# Patient Record
Sex: FEMALE | Race: WHITE
[De-identification: names, ages, dates, MRNs, and addresses within clinical notes are randomized per-mention and may not be internally consistent; named-entity substitution may affect disease eponyms.]

---

## 2021-09-23 ENCOUNTER — NON-APPOINTMENT (OUTPATIENT)
Age: 51
End: 2021-09-23

## 2021-11-10 PROBLEM — Z00.00 ENCOUNTER FOR PREVENTIVE HEALTH EXAMINATION: Status: ACTIVE | Noted: 2021-11-10

## 2021-11-19 ENCOUNTER — APPOINTMENT (OUTPATIENT)
Dept: NEUROLOGY | Facility: CLINIC | Age: 51
End: 2021-11-19
Payer: MEDICARE

## 2021-11-19 VITALS
DIASTOLIC BLOOD PRESSURE: 97 MMHG | TEMPERATURE: 97.3 F | WEIGHT: 169 LBS | SYSTOLIC BLOOD PRESSURE: 143 MMHG | OXYGEN SATURATION: 96 % | BODY MASS INDEX: 29.57 KG/M2 | HEIGHT: 63.5 IN | HEART RATE: 117 BPM

## 2021-11-19 DIAGNOSIS — Z87.42 PERSONAL HISTORY OF OTHER DISEASES OF THE FEMALE GENITAL TRACT: ICD-10-CM

## 2021-11-19 DIAGNOSIS — Z78.9 OTHER SPECIFIED HEALTH STATUS: ICD-10-CM

## 2021-11-19 DIAGNOSIS — Z82.49 FAMILY HISTORY OF ISCHEMIC HEART DISEASE AND OTHER DISEASES OF THE CIRCULATORY SYSTEM: ICD-10-CM

## 2021-11-19 DIAGNOSIS — Z83.3 FAMILY HISTORY OF DIABETES MELLITUS: ICD-10-CM

## 2021-11-19 DIAGNOSIS — R52 PAIN, UNSPECIFIED: ICD-10-CM

## 2021-11-19 DIAGNOSIS — Z86.39 PERSONAL HISTORY OF OTHER ENDOCRINE, NUTRITIONAL AND METABOLIC DISEASE: ICD-10-CM

## 2021-11-19 DIAGNOSIS — Z87.09 PERSONAL HISTORY OF OTHER DISEASES OF THE RESPIRATORY SYSTEM: ICD-10-CM

## 2021-11-19 DIAGNOSIS — Z87.39 PERSONAL HISTORY OF OTHER DISEASES OF THE MUSCULOSKELETAL SYSTEM AND CONNECTIVE TISSUE: ICD-10-CM

## 2021-11-19 PROCEDURE — 99204 OFFICE O/P NEW MOD 45 MIN: CPT

## 2021-11-19 RX ORDER — NORTRIPTYLINE HYDROCHLORIDE 50 MG/1
50 CAPSULE ORAL
Refills: 0 | Status: ACTIVE | COMMUNITY

## 2021-11-19 RX ORDER — GABAPENTIN 300 MG
300 TABLET ORAL
Refills: 0 | Status: ACTIVE | COMMUNITY

## 2021-11-19 RX ORDER — TOPIRAMATE 25 MG/1
25 TABLET, FILM COATED ORAL
Refills: 0 | Status: ACTIVE | COMMUNITY

## 2021-11-19 RX ORDER — ZOLPIDEM TARTRATE 5 MG/1
5 TABLET, FILM COATED ORAL
Refills: 0 | Status: ACTIVE | COMMUNITY

## 2021-11-19 RX ORDER — MONTELUKAST SODIUM 10 MG/1
TABLET, FILM COATED ORAL
Refills: 0 | Status: ACTIVE | COMMUNITY

## 2021-11-19 RX ORDER — IBUPROFEN 800 MG/1
800 TABLET, FILM COATED ORAL
Refills: 0 | Status: ACTIVE | COMMUNITY

## 2021-11-19 RX ORDER — BACLOFEN 10 MG/1
10 TABLET ORAL
Refills: 0 | Status: ACTIVE | COMMUNITY

## 2021-11-19 RX ORDER — OXYCODONE HYDROCHLORIDE 5 MG/1
CAPSULE ORAL
Refills: 0 | Status: ACTIVE | COMMUNITY

## 2021-11-19 RX ORDER — LEVOTHYROXINE SODIUM 100 UG/1
100 TABLET ORAL
Refills: 0 | Status: ACTIVE | COMMUNITY

## 2021-11-19 RX ORDER — ALBUTEROL SULFATE 90 UG/1
108 (90 BASE) AEROSOL, METERED RESPIRATORY (INHALATION)
Refills: 0 | Status: ACTIVE | COMMUNITY

## 2021-11-19 RX ORDER — DULOXETINE HYDROCHLORIDE 60 MG/1
60 CAPSULE, DELAYED RELEASE ORAL
Refills: 0 | Status: ACTIVE | COMMUNITY

## 2021-11-19 RX ORDER — BUTALBITAL AND ACETAMINOPHEN 50; 325 MG/1; MG/1
TABLET ORAL
Refills: 0 | Status: ACTIVE | COMMUNITY

## 2021-11-19 RX ORDER — LISINOPRIL 5 MG/1
5 TABLET ORAL DAILY
Refills: 0 | Status: ACTIVE | COMMUNITY

## 2021-11-19 RX ORDER — FLUTICASONE PROPIONATE 220 UG/1
220 AEROSOL, METERED RESPIRATORY (INHALATION)
Refills: 0 | Status: ACTIVE | COMMUNITY

## 2021-11-19 NOTE — HISTORY OF PRESENT ILLNESS
[FreeTextEntry1] : 51F with PMH of fibromyalgia here for second opinion regarding treatment \par \par She reports severe diffuse pain, limiting ambulation, sometimes stays in bed for prolonged amounts of time\par She used to see a neurologist who prescribed nortriptyline, but stopped; she also sees a physiatrist in [place] who has been prescribing oxycodone and giving trigger point injections.\par She has also seen a chiropractor, deep tissue massage - but feels it made her worse and caused a disc herniation in her neck \par She did PT for a year and a half but did not think it helped \par Symptoms include diffuse hypersensitivity to light touch, swelling in her distal LEs.\par She takes Topamax, Nortriptyline, Gabapentin, Duloxetine, and Baclofen for her symptoms.\par \par She also mentions a hx of lumbar spondylosis s/p two decompressive surgeries on the right side at L5-S1. Her sciatica has improved since the surgery but she has residual numbness. Now she has sciatic pain on her left side.\par \par Personally reviewed and interpreted:\par MRI L spine - s/p right L5/S1 hemilaminectomy, otherwise unremarkable\par MRI C spine - disc osteophyte complex on the right at C5/6 with foraminal narrowing

## 2021-11-19 NOTE — ASSESSMENT
[FreeTextEntry1] : Impression\par - Hx and exam consistent with Fibromyalgia. Differential also includes Chronic Fatigue syndrome / myalgic encephalomyelitis as she describes a combination of debilitating pain and fatigue after physical exertion. Patient is on or has tried all of the recommended medical therapies for Fibromyalgia.\par \par Plan\par - C/w current medications\par - Rec restarting with PT - gradual increase in activity \par - Discussed utility of Cognitive Behavioral Therapy and alternative treatment for pain including acupuncture\par - I have recommended she see a specialist / team of specialists for CFS/ME/fibromyalgia - given number to call in [place] area to establish care\par - return as needed

## 2023-05-11 NOTE — PHYSICAL EXAM
Caller: [facility]    Doctor: Jayleen Ram    Reason for call: Calling to report significant findings on xray      Call back#: na
Thank you!
[FreeTextEntry1] : Motor: UEs - 4/5 b/l proximally (effort limited secondary to pain), 5/5 b/l distally; LEs - 5/5 b/l\par Sensory: Decreased temperature sensation in b/l feet; decreased sensation to pinprick in b/l feet. Otherwise intact\par Reflexes: 2+ brachioradialis, biceps, triceps, patellar. 2+ left achilles, 1+ right achilles\par Gait: normal \par MSK: diffuse muscle tenderness
none